# Patient Record
Sex: MALE
[De-identification: names, ages, dates, MRNs, and addresses within clinical notes are randomized per-mention and may not be internally consistent; named-entity substitution may affect disease eponyms.]

---

## 2019-11-01 ENCOUNTER — APPOINTMENT (OUTPATIENT)
Dept: ORTHOPEDIC SURGERY | Facility: CLINIC | Age: 19
End: 2019-11-01
Payer: COMMERCIAL

## 2019-11-01 VITALS — HEIGHT: 71 IN | BODY MASS INDEX: 23.8 KG/M2 | WEIGHT: 170 LBS

## 2019-11-01 DIAGNOSIS — M23.91 UNSPECIFIED INTERNAL DERANGEMENT OF RIGHT KNEE: ICD-10-CM

## 2019-11-01 PROBLEM — Z00.00 ENCOUNTER FOR PREVENTIVE HEALTH EXAMINATION: Status: ACTIVE | Noted: 2019-11-01

## 2019-11-01 PROCEDURE — 73562 X-RAY EXAM OF KNEE 3: CPT | Mod: RT

## 2019-11-01 PROCEDURE — 99204 OFFICE O/P NEW MOD 45 MIN: CPT

## 2019-11-01 NOTE — PHYSICAL EXAM
[Normal RLE] : Right Lower Extremity: No scars, rashes, lesions, ulcers, skin intact [Normal Touch] : sensation intact for touch [Normal] : No swelling, no edema, normal pedal pulses and normal temperature [de-identified] : Right knee shows no warmth or swelling. There is a full range of motion. There is some tenderness over the quadriceps tendon with flexion of his knee. 5/5 strength. Negative Codey no joint line pain no evidence of instability. Neurovascular exam is normal. [de-identified] : Right knee x-ray is normal

## 2019-11-01 NOTE — HISTORY OF PRESENT ILLNESS
[de-identified] : Location: right knee\par Quality: sharp, shooting\par Current pain level: 2/10\par Duration:10/29/2019\par Context: was at gym and was doing lunges/ light workout and felt something off in knee, bent down to take off shoes and felt sharp shooting pain in knee\par Aggravating Factors: taking off shoes, bending, going up stairs \par Conservative treatment:resting leg \par Associated Symptoms:\par Prior Studies:\par

## 2019-11-01 NOTE — REVIEW OF SYSTEMS
[Negative] : Heme/Lymph [Arthralgia] : arthralgia [Joint Pain] : joint pain [Joint Stiffness] : no joint stiffness [Joint Swelling] : no joint swelling

## 2023-05-24 NOTE — DISCUSSION/SUMMARY
----- Message from Ruby Carey sent at 5/24/2023  8:47 AM CDT -----  Regarding: FW: Appointment Request  Contact: [phone]    ----- Message -----  From: Yanelis Zeng  Sent: 5/22/2023   9:24 AM CDT  To: [facility] [address] [facility]  Subject: Appointment Request                              Appointment Request From: Yanelis Zeng    With Provider: Lizzie Stanton DO [[facility] [address]]    Preferred Date Range: 5/22/2023 – 5/29/2023    Preferred Times: Any Time    Reason for visit: Injury/Illness    Comments:  I think I have a carpal tunnel issue .  A lot of pain in right wrist along with some popping if I move it a certain way.  I am also experiencing some pain along the back, right side rib cage below the shoulder blade that I would like to get checked out.    
Called Patient 05/24/23 to schedule appointment for Carpal tunnel but was able to schedule and see Dr.Hulesch for the concerns patient had.  
[de-identified] : This patient has a sprain of his right quadriceps tendon. I recommended some gentle stretching. He will refrain from vigorous exercise until the symptoms go away. If the pain returns he will let me know. He says it is getting better.